# Patient Record
Sex: FEMALE
[De-identification: names, ages, dates, MRNs, and addresses within clinical notes are randomized per-mention and may not be internally consistent; named-entity substitution may affect disease eponyms.]

---

## 2022-11-19 ENCOUNTER — NURSE TRIAGE (OUTPATIENT)
Dept: OTHER | Facility: CLINIC | Age: 42
End: 2022-11-19

## 2022-11-19 NOTE — TELEPHONE ENCOUNTER
Location of patient: [address] [facility]  call from [facility] at [place] [address] Name: Kath Mcgill Run [facility]    Subjective: Caller states \"I am trying to get a refill on my Effexor\"     Current Symptoms: States that she has called several times and left messages along with sending requests thru the portal to get the refill and/or see if needs an appointment for the refill. States that pharmacy has been trying to reach the office as well to get the refill. Neither has heard back. She has been out of the Effexor 37.5 XR for 1 week. Some anxiety and upset stomach. No depression. Triage indicates for patient to  Call [facility]    Care advice provided, patient verbalizes understanding; denies any other questions or concerns; instructed to call back for any new or worsening symptoms. Spoke with Dr. Pancho Finch. Advised that they are unable to call in prescriptions over the weekend. If patient starts with severe symptoms, including anxiety/panic attacks or depression, to go to [facility] for evaluation. If remains stable ok to wait until Monday to have the prescription refilled. Caller aware and ok with plan. Advised to call back with any questions, concerns, new or worsening symptoms.       Reason for Disposition   [1] Prescription refill request for ESSENTIAL medicine (i.e., likelihood of harm to patient if not taken) AND [2] triager unable to refill per department policy    Protocols used: Medication Refill and Renewal Call-ADULT-